# Patient Record
Sex: FEMALE | Race: BLACK OR AFRICAN AMERICAN | NOT HISPANIC OR LATINO | Employment: STUDENT | URBAN - METROPOLITAN AREA
[De-identification: names, ages, dates, MRNs, and addresses within clinical notes are randomized per-mention and may not be internally consistent; named-entity substitution may affect disease eponyms.]

---
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
## 2024-01-20 ENCOUNTER — HOSPITAL ENCOUNTER (EMERGENCY)
Facility: OTHER | Age: 19
Discharge: HOME | End: 2024-01-20
Attending: EMERGENCY MEDICINE
Payer: MEDICAID

## 2024-01-20 VITALS
WEIGHT: 130 LBS | HEART RATE: 56 BPM | OXYGEN SATURATION: 100 % | TEMPERATURE: 98 F | DIASTOLIC BLOOD PRESSURE: 57 MMHG | SYSTOLIC BLOOD PRESSURE: 118 MMHG | HEIGHT: 65 IN | RESPIRATION RATE: 16 BRPM | BODY MASS INDEX: 21.66 KG/M2

## 2024-01-20 DIAGNOSIS — U07.1 COVID-19 VIRUS INFECTION: Primary | ICD-10-CM

## 2024-01-20 DIAGNOSIS — R10.2 PELVIC CRAMPING: ICD-10-CM

## 2024-01-20 DIAGNOSIS — Z3A.22 22 WEEKS GESTATION OF PREGNANCY: ICD-10-CM

## 2024-01-20 LAB
ALBUMIN SERPL BCP-MCNC: 3.5 G/DL (ref 3.5–5.2)
ALP SERPL-CCNC: 53 U/L (ref 55–135)
ALT SERPL W/O P-5'-P-CCNC: 18 U/L (ref 10–44)
ANION GAP SERPL CALC-SCNC: 5 MMOL/L (ref 8–16)
AST SERPL-CCNC: 14 U/L (ref 10–40)
BASOPHILS # BLD AUTO: 0.02 K/UL (ref 0–0.2)
BASOPHILS NFR BLD: 0.2 % (ref 0–1.9)
BILIRUB SERPL-MCNC: 0.3 MG/DL (ref 0.1–1)
BILIRUB SERPL-MCNC: NEGATIVE MG/DL
BLOOD URINE, POC: NEGATIVE
BUN SERPL-MCNC: 5 MG/DL (ref 6–20)
CALCIUM SERPL-MCNC: 9.5 MG/DL (ref 8.7–10.5)
CHLORIDE SERPL-SCNC: 105 MMOL/L (ref 95–110)
CO2 SERPL-SCNC: 28 MMOL/L (ref 23–29)
COLOR, POC UA: NORMAL
CREAT SERPL-MCNC: 0.5 MG/DL (ref 0.5–1.4)
CTP QC/QA: YES
CTP QC/QA: YES
DIFFERENTIAL METHOD BLD: ABNORMAL
EOSINOPHIL # BLD AUTO: 0.1 K/UL (ref 0–0.5)
EOSINOPHIL NFR BLD: 1.4 % (ref 0–8)
ERYTHROCYTE [DISTWIDTH] IN BLOOD BY AUTOMATED COUNT: 12 % (ref 11.5–14.5)
EST. GFR  (NO RACE VARIABLE): >60 ML/MIN/1.73 M^2
GLUCOSE SERPL-MCNC: 75 MG/DL (ref 70–110)
GLUCOSE UR QL STRIP: NEGATIVE
HCT VFR BLD AUTO: 36.8 % (ref 37–48.5)
HGB BLD-MCNC: 12.1 G/DL (ref 12–16)
IMM GRANULOCYTES # BLD AUTO: 0.06 K/UL (ref 0–0.04)
IMM GRANULOCYTES NFR BLD AUTO: 0.7 % (ref 0–0.5)
KETONES UR QL STRIP: NEGATIVE
LEUKOCYTE ESTERASE URINE, POC: NEGATIVE
LYMPHOCYTES # BLD AUTO: 2.2 K/UL (ref 1–4.8)
LYMPHOCYTES NFR BLD: 24.4 % (ref 18–48)
MCH RBC QN AUTO: 29.7 PG (ref 27–31)
MCHC RBC AUTO-ENTMCNC: 32.9 G/DL (ref 32–36)
MCV RBC AUTO: 90 FL (ref 82–98)
MONOCYTES # BLD AUTO: 0.5 K/UL (ref 0.3–1)
MONOCYTES NFR BLD: 5.5 % (ref 4–15)
NEUTROPHILS # BLD AUTO: 6 K/UL (ref 1.8–7.7)
NEUTROPHILS NFR BLD: 67.8 % (ref 38–73)
NITRITE, POC UA: NEGATIVE
NRBC BLD-RTO: 0 /100 WBC
PH, POC UA: 8
PLATELET # BLD AUTO: 214 K/UL (ref 150–450)
PLATELET BLD QL SMEAR: ABNORMAL
PMV BLD AUTO: 9.8 FL (ref 9.2–12.9)
POC MOLECULAR INFLUENZA A AGN: NEGATIVE
POC MOLECULAR INFLUENZA B AGN: NEGATIVE
POTASSIUM SERPL-SCNC: 3.6 MMOL/L (ref 3.5–5.1)
PROT SERPL-MCNC: 6.9 G/DL (ref 6–8.4)
PROTEIN, POC: NEGATIVE
RBC # BLD AUTO: 4.07 M/UL (ref 4–5.4)
SARS-COV-2 RDRP RESP QL NAA+PROBE: POSITIVE
SODIUM SERPL-SCNC: 138 MMOL/L (ref 136–145)
SPECIFIC GRAVITY, POC UA: 1
UROBILINOGEN, POC UA: NEGATIVE
WBC # BLD AUTO: 8.8 K/UL (ref 3.9–12.7)

## 2024-01-20 PROCEDURE — 59025 FETAL NON-STRESS TEST: CPT

## 2024-01-20 PROCEDURE — 80053 COMPREHEN METABOLIC PANEL: CPT | Performed by: EMERGENCY MEDICINE

## 2024-01-20 PROCEDURE — 85025 COMPLETE CBC W/AUTO DIFF WBC: CPT | Performed by: EMERGENCY MEDICINE

## 2024-01-20 PROCEDURE — 99284 EMERGENCY DEPT VISIT MOD MDM: CPT | Mod: ,,, | Performed by: OBSTETRICS & GYNECOLOGY

## 2024-01-20 PROCEDURE — 87635 SARS-COV-2 COVID-19 AMP PRB: CPT | Performed by: PHYSICIAN ASSISTANT

## 2024-01-20 PROCEDURE — 99284 EMERGENCY DEPT VISIT MOD MDM: CPT | Mod: 25

## 2024-01-20 PROCEDURE — 96360 HYDRATION IV INFUSION INIT: CPT

## 2024-01-20 PROCEDURE — 81002 URINALYSIS NONAUTO W/O SCOPE: CPT

## 2024-01-20 PROCEDURE — 63600175 PHARM REV CODE 636 W HCPCS: Performed by: OBSTETRICS & GYNECOLOGY

## 2024-01-20 PROCEDURE — 25000003 PHARM REV CODE 250: Performed by: OBSTETRICS & GYNECOLOGY

## 2024-01-20 RX ORDER — ONDANSETRON 4 MG/1
4 TABLET, ORALLY DISINTEGRATING ORAL ONCE
Status: COMPLETED | OUTPATIENT
Start: 2024-01-20 | End: 2024-01-20

## 2024-01-20 RX ORDER — ACETAMINOPHEN 500 MG
1000 TABLET ORAL ONCE
Status: COMPLETED | OUTPATIENT
Start: 2024-01-20 | End: 2024-01-20

## 2024-01-20 RX ADMIN — ONDANSETRON 4 MG: 4 TABLET, ORALLY DISINTEGRATING ORAL at 12:01

## 2024-01-20 RX ADMIN — ACETAMINOPHEN 1000 MG: 500 TABLET ORAL at 12:01

## 2024-01-20 RX ADMIN — SODIUM CHLORIDE, POTASSIUM CHLORIDE, SODIUM LACTATE AND CALCIUM CHLORIDE 1000 ML: 600; 310; 30; 20 INJECTION, SOLUTION INTRAVENOUS at 12:01

## 2024-01-20 NOTE — ED PROVIDER NOTES
"Source of History:  Patient     Chief complaint:  COVID-19 Concerns (Pt reports runny nose, cough, congestion, vomiting x 1 week. Pt states she is 22 weeks pregnant. )      HPI:  Essence Herman is a 18 y.o. female who is 22 weeks gestational age, , presenting to the emergency department with rhinorrhea, headaches, cough over the past 2-3 days.  She has been vomiting throughout the week as well.  She reports diffuse pelvic cramping that started last night.  Discomfort is constant.  She denies vaginal bleeding or loss of fluids.  She receives her OB care at [facility], had confirmed IUP.  She is feeling baby move.      ROS: As per HPI     Review of patient's allergies indicates:  No Known Allergies    PMH:  As per HPI and below:  History reviewed. No pertinent past medical history.  History reviewed. No pertinent surgical history.    Social History     Tobacco Use    Smoking status: Never    Smokeless tobacco: Never   Substance Use Topics    Alcohol use: Not Currently    Drug use: Never       Physical Exam:    BP (!) 116/56   Pulse 78   Temp 98 °F (36.7 °C) (Oral)   Resp 16   Ht 5' 5" (1.651 m)   Wt 59 kg (130 lb)   SpO2 100%   BMI 21.63 kg/m²     Nursing note and vital signs reviewed.    Appearance: No acute distress. Non toxic appearing.   Eyes: No conjunctival injection.  HENT: Oropharynx clear.    Chest/ Respiratory: Clear to auscultation bilaterally.  Good air movement.  No wheezes.  No rhonchi. No rales. No accessory muscle use.  Cardiovascular: Regular rate and rhythm.  No murmurs. No gallops. No rubs.  Abdomen: Soft.  Gravid uterus.  Nontender.  No guarding.  No rebound. Non-peritoneal.  Musculoskeletal: Good range of motion all joints.  No deformities.  Neck supple.  No meningismus.  Skin: No rashes seen.  Good turgor.  Neurologic: Motor intact.  Sensation intact.  Mental Status:  Alert and oriented x 3.  Appropriate, conversant.     Medical Decision Making  Patient is a 18-year-old female " who is 22 weeks pregnant, presenting to the emergency department with cough and congestion for the past few days as well as nausea and pelvic cramping.  Patient is afebrile, nontoxic appearing hemodynamically stable.    Differential diagnosis includes COVID, influenza, URI, dehydration, metabolic derangement, OB complications, early labor    Amount and/or Complexity of Data Reviewed  Labs: ordered.         ED Course as of 01/20/24 1125   Sat Jan 20, 2024   1124 Patient appears well.  Rapid COVID test is positive.  Will send to OB ED for further OB workup given pelvic complaints. [AG]      ED Course User Index  [AG] Oscar Ordoñez PA-C           Diagnostic Impression:    1. COVID-19 virus infection    2. Pelvic cramping         ED Disposition Condition    Send to L&D Stable                 This note was created using M Modal Fluency Direct. There may be typographical errors secondary to dictation.        Oscar Ordoñez PA-C  01/20/24 1120

## 2024-01-20 NOTE — ED PROVIDER NOTES
Encounter Date: 2024       History     Chief Complaint   Patient presents with    COVID-19 Concerns     Pt reports runny nose, cough, congestion, vomiting x 1 week. Pt states she is 22 weeks pregnant.     Abdominal Pain     H/o N&V. Last solid food for breakfast yesterday. Tolerating liquids.     Patient is a 18 y.o.  at 22 0/7 weeks with COVID and abdominal pain.  PNC with [facility] without complications.  No VB, vaginal D/C.  Good FM.  Sxs for COVID began .      Review of patient's allergies indicates:  No Known Allergies  History reviewed. No pertinent past medical history.  History reviewed. No pertinent surgical history.  History reviewed. No pertinent family history.  Social History     Tobacco Use    Smoking status: Never    Smokeless tobacco: Never   Substance Use Topics    Alcohol use: Not Currently    Drug use: Never     Review of Systems   Constitutional:  Negative for chills and fever.   HENT:  Positive for congestion.    Eyes:  Negative for visual disturbance.   Respiratory:  Positive for cough. Negative for shortness of breath.    Cardiovascular:  Negative for chest pain, palpitations and leg swelling.   Gastrointestinal:  Positive for nausea. Negative for constipation, diarrhea and vomiting.   Genitourinary:  Negative for dysuria, genital sores, vaginal bleeding and vaginal discharge.   Skin:  Negative for rash.   Neurological:  Positive for headaches.   Psychiatric/Behavioral:  Negative for dysphoric mood. The patient is not nervous/anxious.        Physical Exam     Initial Vitals [24 1032]   BP Pulse Resp Temp SpO2   (!) 116/56 78 16 98 °F (36.7 °C) 100 %      MAP       --         Physical Exam    Vitals reviewed.  Constitutional: She appears well-developed and well-nourished. She is not diaphoretic. No distress.   HENT:   Head: Normocephalic and atraumatic.   Cardiovascular:  Normal rate.           Pulmonary/Chest: No respiratory distress.   Abdominal: There is no abdominal  tenderness. There is no rebound and no guarding.   Musculoskeletal:         General: No tenderness or edema.     Neurological: She is alert and oriented to person, place, and time.   Skin: Skin is warm and dry.   Psychiatric: She has a normal mood and affect. Thought content normal.         ED Course   Obtain Fetal nonstress test (NST)    Date/Time: 1/20/2024 12:17 PM    Performed by: Johanny Werner MD  Authorized by: Johanny Werner MD      Labs Reviewed   CBC W/ AUTO DIFFERENTIAL - Abnormal; Notable for the following components:       Result Value    Hematocrit 36.8 (*)     All other components within normal limits   COMPREHENSIVE METABOLIC PANEL - Abnormal; Notable for the following components:    BUN 5 (*)     Alkaline Phosphatase 53 (*)     Anion Gap 5 (*)     All other components within normal limits   SARS-COV-2 RDRP GENE - Abnormal; Notable for the following components:    POC Rapid COVID Positive (*)     All other components within normal limits   POCT INFLUENZA A/B MOLECULAR   POCT URINALYSIS, DIPSTICK OR TABLET REAGENT, AUTOMATED, WITH MICROSCOP   TYPE AND SCREEN LABOR & DELIVERY          Imaging Results    None          Medications   lactated ringers bolus 1,000 mL (1,000 mLs Intravenous New Bag 1/20/24 1231)   ondansetron disintegrating tablet 4 mg (4 mg Oral Given 1/20/24 1233)   acetaminophen tablet 1,000 mg (1,000 mg Oral Given 1/20/24 1234)     Medical Decision Making  Amount and/or Complexity of Data Reviewed  Labs: ordered. Decision-making details documented in ED Course.    Risk  OTC drugs.  Prescription drug management.               ED Course as of 01/20/24 1324   Sat Jan 20, 2024   1124 Patient appears well.  Rapid COVID test is positive.  Will send to OB ED for further OB workup given pelvic complaints. [AG]   1137 SARS-CoV-2 RNA, Amplification, Qual(!): Positive [CD]   1137 POC Molecular Influenza A Ag: Negative [CD]   1137 POC Molecular Influenza B Ag: Negative [CD]   1137  BP(!): 116/56 [CD]   1137 Temp: 98 °F (36.7 °C) [CD]   1137 Pulse: 78 [CD]   1137 Resp: 16 [CD]   1137 SpO2: 100 % [CD]   1202 BP(!): 111/54 [CD]   1203 Temp: 98 °F (36.7 °C) [CD]   1203 Pulse: 67 [CD]   1203 SpO2: 100 % [CD]   1203 Resp: 16 [CD]   1323 WBC: 8.80 [CD]   1323 Hemoglobin: 12.1 [CD]   1323 Hematocrit(!): 36.8 [CD]   1323 Platelet Count: 214 [CD]   1323 AST: 14 [CD]   1323 ALT: 18 [CD]   1323 Creatinine: 0.5 [CD]      ED Course User Index  [AG] Oscar Ordoñez, REYNA  [CD] Johanny Werner MD               Medical Decision Making:   Initial Assessment:   18 y.o.  at 22 0/7 weeks with COVID and abdominal pain  ED Management:  FHTs 150s.  VSS and afebrile in AP.  UA negative, no ketones.  Zofran for nausea and tylenol for HA.  IVF for irritability noted on toco.  Exam not c/w labor. Labs normal.  Abdominal pain improved.  Discharge to home  Will f/u with OB in 1 week.             Clinical Impression:  Final diagnoses:  [U07.1] COVID-19 virus infection (Primary)  [R10.2] Pelvic cramping  [Z3A.22] 22 weeks gestation of pregnancy          ED Disposition Condition    Discharge Stable          ED Prescriptions    None       Follow-up Information    None          Johanny Werner MD  [id number]

## 2024-01-20 NOTE — Clinical Note
"Essence Goldbergshravan Herman was seen and treated in our emergency department on 1/20/2024.  She may return to work on 01/24/2024.       If you have any questions or concerns, please don't hesitate to call.      NGUYEN Yates RN RN    "

## 2024-01-20 NOTE — Clinical Note
"Essence Mujica" Herman was seen and treated in our emergency department on 1/20/2024.  She may return to work on 01/24/2024.       If you have any questions or concerns, please don't hesitate to call.       RN    "

## 2024-01-20 NOTE — DISCHARGE INSTRUCTIONS
Please read through your discharge information thoroughly. Note pregnancy warning signs listed i.e. headache, lower abdominal and/or back pain unresolved by Tylenol, or is persistent; sudden and/or increased swelling; visual disturbances; vaginal bleeding and or leakage of water and decreased or absent fetal movement. Ensure to stay hydrated and eat small, frequent, balanced meals. Keep all follow-up appointments. Take prescribed medications as ordered at the same time daily for the specified length of time. Tylenol tablets 1 gram can be taken every 6-hrs for pain.   If you have any questions or concerns, please call AP at # [phone]. For all emergencies please call 911!